# Patient Record
(demographics unavailable — no encounter records)

---

## 2025-01-30 NOTE — REASON FOR VISIT
[Interpreters_IDNumber] : [id number] [Interpreters_FullName] : Clarence [TWNoteComboBox1] : Slovenian [TextEntry] : Reason for visit: Follow Up Med Check Voids per day:   6 Voids per night:   3-4 Urge incontinence: No Stress incontinence: Yes Constipation: No Fecal incontinence: No  Vaginal bulge: No

## 2025-01-30 NOTE — DISCUSSION/SUMMARY
[FreeTextEntry1] : HTPFD Discussed follow up with her PCP for back pain, since improving back pain helps with her pelvic pain Will try Rx Baclofen 10mg BID , 30 days, 1 refill Precautions discussed with patient Follow up in 3 months with a PA   Stress Incontinence Discussed pessary vs surgery management in detail All questions answered with   Patient is interested in surgical management Schedule UDS without reduction and surgical counseling with Dr Phipps

## 2025-01-30 NOTE — COUNSELING
[FreeTextEntry1] : If you feel like you have an infection it is important for you to call our office and we will arrange testing of your urine.  Please begin taking Baclofen 10 mg twice a day. It takes up to 6 weeks to go into full effect. Please  your refill when you complete the 1st bottle.  Please call my office if you have any issues with the cost or side effects of the medication.   Schedule bladder function testing (UDS without reduction) with RALPH Yu or Dr Phipps.    Please call the office if you feel like you have an infection because we cannot do the bladder function testing in the setting of an infection.   Please come with a full, not painful bladder.  Please schedule a surgical consultation/med check with Dr Phipps after UDS.

## 2025-01-30 NOTE — REASON FOR VISIT
[Interpreters_IDNumber] : [id number] [Interpreters_FullName] : Clarence [TWNoteComboBox1] : Israeli [TextEntry] : Reason for visit: Follow Up Med Check Voids per day:   6 Voids per night:   3-4 Urge incontinence: No Stress incontinence: Yes Constipation: No Fecal incontinence: No  Vaginal bulge: No

## 2025-01-30 NOTE — HISTORY OF PRESENT ILLNESS
[FreeTextEntry1] : Patient is here for 3 months med check for  stress incontinence, myalgia.  Last seen on 10/310/24 for cystoscopy for AMH.  42 year para 3 ( x3) presents with complaints of leakage of urine with coughing, sneezing for the past 6 years. Tried pelvic floor PT and did not help. She also complains of pelvic pain. Pain is worse with a full bladder. Also reports post-void dribbling.  Stress urinary incontinence: 20+ x/week no prior incontinence procedures  No prolapse, PVR: 10cc  Flexeril 5mg qhs - BID s/p Pelvic PT: no improvement    Today, patient states that Flexeril 5mg BID did not help her pain, stopped it in December. Reports that she feels that her pelvic pain is coming from her back. When she walks a lot her back pain gets worse and her pelvic pain gets worse. Heating pads help the pain. When she takes Tylenol her back pain gets better and her pelvic pain gets better. Was seeing chiropractor and felt better, until she felt worse and stopped.  Denies side effects. Patient does not feel she has an infection. Advil causes burning in her stomach.

## 2025-04-07 NOTE — ASSESSMENT
[FreeTextEntry1] : Bladder pain -  Discussed possible etiologies with the patient, including infectious versus non-infectious. Given characteristics, physical exam findings and duration of her symptoms, I discussed with her that it is likely that her symptoms may be due to IC/PBS. Discussed workup and treatment options. Will start with empiric treatment with IC diet. Discussed also taking Prelief PRN for known irritants.  Post void dribbling - Discussed possible etiologies and tx options. Will start with Gemtesa. Will call in 6 weeks. If continues to have sxs will have pt follow up with PA or NP.  RUCHI - Unable to elicit RUCHI during UDS despite pt ultimately voiding >700cc. Will work on the above conditions first. Once they are treated will revisit her RUCHI sxs and possibly bring back for cough stress test.

## 2025-04-07 NOTE — COUNSELING
[FreeTextEntry1] : We will call you in 6 weeks.  Please start taking Gemtesa for your bladder and let us know if you have any problems picking up the medication from the pharmacy.  For the next 2 weeks, please stop the use of the followin. Coffee (Caffeinated and decaffeinated)  2. Teas (Caffeinated, decaffeinated, Ice tea, and green teas  3. All sodas (Caffeinated, decaffeinated, energy drinks)  4. All carbonated drinks including seltzer water  5. All citric fruit juices  6. Water with lemon or lime  7. Spicy foods  8. Tomato Sauce based foods  9. Chocolate and chocolate containing products  10. All alcohol  You can then start to add back each other above irritants that you normally consume one by one for a few days in a row to figure out which irritants are triggers for your bladder.  You can take Prelief (no prescription necessary) to calm the bladder before consuming the items above if you know which items irritate your bladder.

## 2025-04-07 NOTE — HISTORY OF PRESENT ILLNESS
[FreeTextEntry1] : 43 year old with HTPFD, nocturia, RUCHI, urinary hesitancy, and dyspareunia.  She reports she experiences leakage of urine with strong coughing or sneezing and also she experiences post void dribbling that is bothersome to her.  Her most pressing concern at the moment is bladder discomfort. She notices that the discomfort is worse after eating acidic or spicy foods.  3/18/2025 - UDS: no RUCHI (Stress Urinary Incontinence) no ISD (Intrinsic Sphincteric Deficiency) no DO (Detrusor Overactivity) no Sensory Urgency no Uroflow normal PFS (>700cc voided)  s/p Flexeril - no benefit  From initial visit: 42 year para 3 ( x3) presents with complaints of leakage of urine with coughing, sneezing for the past 6 years. Tried pelvic floor PT and did not help. She also complains of pelvic pain. Pain is worse with a full bladder. Also reports post-void dribbling.  Pelvic organ prolapse: no bulge, no pressure/heaviness Stress urinary incontinence: 20+ x/week no prior incontinence procedures Overactive bladder syndrome: daily frequency 6 x/day, 3 x/night, + urgency, no x/week UUI episodes, 3-4 pads/day Bladder irritants include coffee, Prior OAB meds trospium (developed swelling and dry eyes) Voiding dysfunction: no Incomplete bladder emptying, + hesitancy Lower urinary tract/vaginal symptoms: no UTIs per year, no hematuria, no dysuria, + bladder pain 7 BM/week no constipation Fecal incontinence no Sexually active + Dyspareunia + Pelvic pain + Vaginal dryness + LMP normal menses

## 2025-05-15 NOTE — ASSESSMENT
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 She has no specific symptoms or exam findings to suggest an underlying inflammatory arthritis or systemic connective tissue disease. - f/u labs for inflammatory arthritis - f/u x-rays of b/l hands, elbows, shoulders, feet - Would consider possibly trying gabapentin if pt's labs and x-rays do not demonstrate findings concerning for inflammatory arthritis  f/u in 3 months, will call pt with lab results

## 2025-05-15 NOTE — REASON FOR VISIT
[Initial Evaluation] : an initial evaluation [FreeTextEntry1] : joint pain x 1 year - Bruneian speaking

## 2025-05-15 NOTE — HISTORY OF PRESENT ILLNESS
[FreeTextEntry1] : Pt has a h/o plantar fasciitis, which improved significantly with wearing shoe inserts. Since around 2024, she has noticed pain in her hands, especially in the L 1st and 5th fingers, and in her toes, as well as to a lesser extent in other joints e.g. elbows. The pain is worse with rainy weather or cold; pt sometimes has to sleep in a sweater because the cold exacerbates her joint pain. + Sometimes weakness in the hands; pt has been dropping objects recently. + Intermittent difficulty performing ADLs e.g. combing her hair. She works in housekeeping. Takes Tylenol up to 1000 mg intermittently with improvement of pain. She has gastritis so does not take any other OTC pain medications.  + Rash on b/l knees; pt previously saw dermatology and received intralesional steroid injections, which helped temporarily but then the rash recurred. + Dry eyes. + Low back pain, pt has 2 herniated discs. Pt denies diarrhea.  Physical exam: GEN: Pleasant, AAO woman sitting on exam table in NAD SKIN: + Hyperpigmented minimally flaking patches noted on b/l extensor surfaces of knees MOUTH: Slightly decreased oral aperture, dry mucous membranes, no oral ulcers PULM: Clear to auscultation b/l CV: Regular rate and rhythm, no murmurs MSK Neck: Full ROM b/l Shoulders: Limited abduction to approx 150 degrees b/l, no pain with ROM Elbows :Full ROM b/l, no effusions Wrists: Full ROM b/l, no effusions Hands: no synovitis Hips: Full ROM b/l Knees: No effusions, full ROM b/l Ankles: no effusions, full ROM b/l Feet: + TTP In MTPs b/l EXT: normal nailfold capillaries, no nail changes